# Patient Record
(demographics unavailable — no encounter records)

---

## 2025-03-17 NOTE — ASSESSMENT
[FreeTextEntry1] : will inject with cortisone today Get authorization for Euflexxa series weight loss discussed

## 2025-03-17 NOTE — IMAGING
[Left] : left knee [AP] : anteroposterior [Lateral] : lateral [Moderate tricompartmental OA medial narrowing] : Moderate tricompartmental OA medial narrowing

## 2025-03-17 NOTE — PHYSICAL EXAM
[Left] : left knee [NL (0)] : extension 0 degrees [5___] : hamstring 5[unfilled]/5 [] : negative Lachmann [Equivocal] : equivocal Hemant [FreeTextEntry3] : medial knee scar [TWNoteComboBox7] : flexion 120 degrees

## 2025-03-17 NOTE — PROCEDURE
[Large Joint Injection] : Large joint injection [Left] : of the left [Knee] : knee [Pain] : pain [Alcohol] : alcohol [Betadine] : betadine [Ethyl Chloride sprayed topically] : ethyl chloride sprayed topically [Sterile technique used] : sterile technique used [___ cc    3mg] :  Betamethasone (Celestone) ~Vcc of 3mg [___ cc    1%] : Lidocaine ~Vcc of 1%  [] : Patient tolerated procedure well [Call if redness, pain or fever occur] : call if redness, pain or fever occur [Apply ice for 15min out of every hour for the next 12-24 hours as tolerated] : apply ice for 15 minutes out of every hour for the next 12-24 hours as tolerated [Patient was advised to rest the joint(s) for ____ days] : patient was advised to rest the joint(s) for [unfilled] days [Previous OTC use and PT nontherapeutic] : patient has tried OTC's including aspirin, Ibuprofen, Aleve, etc or prescription NSAIDS, and/or exercises at home and/or physical therapy without satisfactory response [Patient had decreased mobility in the joint] : patient had decreased mobility in the joint [Risks, benefits, alternatives discussed / Verbal consent obtained] : the risks benefits, and alternatives have been discussed, and verbal consent was obtained

## 2025-03-17 NOTE — HISTORY OF PRESENT ILLNESS
[Dull/Aching] : dull/aching [Localized] : localized [Tightness] : tightness [de-identified] : Has increasing discomfort left knee. Had been diagnosed with OA years ago, had CSI which helped. No clicking, locking or giving way [] : no [FreeTextEntry1] : LFT knee  [FreeTextEntry5] : LFT knee pain developed a while ago. Denies injury/trauma. Had gotten csi in the past.

## 2025-04-15 NOTE — PHYSICAL EXAM
[Left] : left knee [NL (0)] : extension 0 degrees [5___] : hamstring 5[unfilled]/5 [Equivocal] : equivocal Hemant [] : negative Lachmann [FreeTextEntry3] : medial knee scar [TWNoteComboBox7] : flexion 120 degrees

## 2025-04-15 NOTE — HISTORY OF PRESENT ILLNESS
[Dull/Aching] : dull/aching [Localized] : localized [Tightness] : tightness [de-identified] : 4/15/25: Here for Euflexxa #1 left knee Has increasing discomfort left knee. Had been diagnosed with OA years ago, had CSI which helped. No clicking, locking or giving way [] : no [FreeTextEntry1] : LFT knee  [FreeTextEntry5] : LFT knee pain developed a while ago. Denies injury/trauma. Had gotten csi in the past.

## 2025-04-22 NOTE — PROCEDURE
[Large Joint Injection] : Large joint injection [Left] : of the left [Knee] : knee [Pain] : pain [Inflammation] : inflammation [X-ray evidence of Osteoarthritis on this or prior visit] : x-ray evidence of Osteoarthritis on this or prior visit [Alcohol] : alcohol [Betadine] : betadine [Ethyl Chloride sprayed topically] : ethyl chloride sprayed topically [Sterile technique used] : sterile technique used [Euflexxa(20mg)] : 20mg of Euflexxa [] : Patient tolerated procedure well [Call if redness, pain or fever occur] : call if redness, pain or fever occur [Apply ice for 15min out of every hour for the next 12-24 hours as tolerated] : apply ice for 15 minutes out of every hour for the next 12-24 hours as tolerated [Patient was advised to rest the joint(s) for ____ days] : patient was advised to rest the joint(s) for [unfilled] days [Previous OTC use and PT nontherapeutic] : patient has tried OTC's including aspirin, Ibuprofen, Aleve, etc or prescription NSAIDS, and/or exercises at home and/or physical therapy without satisfactory response [Patient had decreased mobility in the joint] : patient had decreased mobility in the joint [Risks, benefits, alternatives discussed / Verbal consent obtained] : the risks benefits, and alternatives have been discussed, and verbal consent was obtained [#2] : series #2

## 2025-04-22 NOTE — ASSESSMENT
[FreeTextEntry1] : Euflexxa #2 today left knee, post injection instructions. Ice as reviewed. Follow up 1 week to continue series.

## 2025-04-22 NOTE — HISTORY OF PRESENT ILLNESS
[Dull/Aching] : dull/aching [Localized] : localized [Tightness] : tightness [de-identified] : 4/22/25: Here for left knee pain.  No new issues. Euflexxa #2 L knee.  4/15/25: Here for Euflexxa #1 left knee Has increasing discomfort left knee. Had been diagnosed with OA years ago, had CSI which helped. No clicking, locking or giving way [] : no [FreeTextEntry1] : LFT knee  [FreeTextEntry5] : LFT knee pain developed a while ago. Denies injury/trauma. Had gotten csi in the past.

## 2025-04-29 NOTE — PROCEDURE
[Large Joint Injection] : Large joint injection [Left] : of the left [Knee] : knee [Pain] : pain [Inflammation] : inflammation [X-ray evidence of Osteoarthritis on this or prior visit] : x-ray evidence of Osteoarthritis on this or prior visit [Alcohol] : alcohol [Betadine] : betadine [Ethyl Chloride sprayed topically] : ethyl chloride sprayed topically [Sterile technique used] : sterile technique used [Euflexxa(20mg)] : 20mg of Euflexxa [#3] : series #3 [] : Patient tolerated procedure well [Call if redness, pain or fever occur] : call if redness, pain or fever occur [Apply ice for 15min out of every hour for the next 12-24 hours as tolerated] : apply ice for 15 minutes out of every hour for the next 12-24 hours as tolerated [Patient was advised to rest the joint(s) for ____ days] : patient was advised to rest the joint(s) for [unfilled] days [Previous OTC use and PT nontherapeutic] : patient has tried OTC's including aspirin, Ibuprofen, Aleve, etc or prescription NSAIDS, and/or exercises at home and/or physical therapy without satisfactory response [Patient had decreased mobility in the joint] : patient had decreased mobility in the joint [Risks, benefits, alternatives discussed / Verbal consent obtained] : the risks benefits, and alternatives have been discussed, and verbal consent was obtained

## 2025-04-29 NOTE — HISTORY OF PRESENT ILLNESS
[de-identified] : 04/29/2025 Euflexxa 3 4/22/25: Here for left knee pain.  No new issues. Euflexxa #2 L knee.  4/15/25: Here for Euflexxa #1 left knee Has increasing discomfort left knee. Had been diagnosed with OA years ago, had CSI which helped. No clicking, locking or giving way [Dull/Aching] : dull/aching [Localized] : localized [Tightness] : tightness [] : yes [FreeTextEntry1] : LFT knee  [FreeTextEntry5] : LFT knee pain developed a while ago. Denies injury/trauma. Had gotten csi in the past.

## 2025-06-10 NOTE — HISTORY OF PRESENT ILLNESS
[Dull/Aching] : dull/aching [Localized] : localized [Tightness] : tightness [de-identified] : 6/10/25: Here for follow up, pain has improved.  Feels he continues to walk with a limp on the left side.  Denies spine issues.  04/29/2025 Euflexxa 3 4/22/25: Here for left knee pain.  No new issues. Euflexxa #2 L knee.  4/15/25: Here for Euflexxa #1 left knee Has increasing discomfort left knee. Had been diagnosed with OA years ago, had CSI which helped. No clicking, locking or giving way [] : no [FreeTextEntry1] : LFT knee  [FreeTextEntry5] : LFT knee pain developed a while ago. Denies injury/trauma. Had gotten csi in the past.

## 2025-06-10 NOTE — IMAGING
[Left] : left knee [Lateral] : lateral [AP] : anteroposterior [Moderate tricompartmental OA medial narrowing] : Moderate tricompartmental OA medial narrowing

## 2025-06-10 NOTE — ASSESSMENT
[FreeTextEntry1] : He had good response to Euflexxa series, pain improved.  Needs to work on quad/hip/hamstring strengthening to support knee.   Formal PT offered, he is going to try aquatic exercises first as reviewed [facility] knee conditioning hand out provided Timing and frequency of repeat Euflexxa for beginning of November discussed Return one month to see how he does with HEP, if needed will try PT at that time.

## 2025-06-10 NOTE — PHYSICAL EXAM
[Left] : left knee [NL (0)] : extension 0 degrees [5___] : quadriceps 5[unfilled]/5 [Equivocal] : equivocal Hemant [] : non-antalgic [FreeTextEntry3] : medial knee scar [TWNoteComboBox7] : flexion 120 degrees